# Patient Record
Sex: FEMALE | Race: WHITE | Employment: STUDENT | URBAN - METROPOLITAN AREA
[De-identification: names, ages, dates, MRNs, and addresses within clinical notes are randomized per-mention and may not be internally consistent; named-entity substitution may affect disease eponyms.]

---
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
## 2023-01-08 ENCOUNTER — APPOINTMENT (OUTPATIENT)
Dept: GENERAL RADIOLOGY | Age: 23
End: 2023-01-08
Attending: NURSE PRACTITIONER
Payer: COMMERCIAL

## 2023-01-08 ENCOUNTER — HOSPITAL ENCOUNTER (OUTPATIENT)
Age: 23
Discharge: HOME | End: 2023-01-08
Payer: COMMERCIAL

## 2023-01-08 VITALS
SYSTOLIC BLOOD PRESSURE: 126 MMHG | DIASTOLIC BLOOD PRESSURE: 75 MMHG | TEMPERATURE: 98 F | HEART RATE: 85 BPM | OXYGEN SATURATION: 97 % | RESPIRATION RATE: 16 BRPM

## 2023-01-08 DIAGNOSIS — S69.92XA INJURY OF LEFT WRIST, INITIAL ENCOUNTER: ICD-10-CM

## 2023-01-08 DIAGNOSIS — S52.522A CLOSED TORUS FRACTURE OF DISTAL END OF LEFT RADIUS, INITIAL ENCOUNTER: Primary | ICD-10-CM

## 2023-01-08 PROCEDURE — 73110 X-RAY EXAM OF WRIST: CPT | Performed by: NURSE PRACTITIONER

## 2023-01-08 PROCEDURE — 29125 APPL SHORT ARM SPLINT STATIC: CPT | Performed by: NURSE PRACTITIONER

## 2023-01-08 PROCEDURE — 73090 X-RAY EXAM OF FOREARM: CPT | Performed by: NURSE PRACTITIONER

## 2023-01-08 PROCEDURE — 99213 OFFICE O/P EST LOW 20 MIN: CPT | Performed by: NURSE PRACTITIONER

## 2023-01-08 NOTE — DISCHARGE INSTRUCTIONS
Keep splint on and dry at all times. No use of extremity or no weight-bearing until cleared by specialist.   Elevate when seated, and apply ice every 2-3 hours for 20 minutes. Take recommended pain medications as instructed. Over-the-counter ibuprofen or Tylenol  Follow-up with orthopedic.    Return immediately for loss of motor or sensory function to the affected extremity, severe pain, or any new condition

## 2023-01-08 NOTE — ED INITIAL ASSESSMENT (HPI)
1/7 0600 Pt fell backwards onto her left wrist and heard a pop.   Pt c/o lateral and medial left wrist pain, +full ROM but painful, slight bruising noted    Denies: numbness/tingling at this time

## 2024-09-12 ENCOUNTER — TELEMEDICINE (OUTPATIENT)
Dept: FAMILY MEDICINE CLINIC | Facility: CLINIC | Age: 24
End: 2024-09-12
Payer: COMMERCIAL

## 2024-09-12 DIAGNOSIS — J06.9 URI, ACUTE: Primary | ICD-10-CM

## 2024-09-12 PROCEDURE — 99213 OFFICE O/P EST LOW 20 MIN: CPT | Performed by: FAMILY MEDICINE

## 2024-09-12 NOTE — PROGRESS NOTES
Virtual/Telephone Check-In    Lucretia Drew verbally consents to a Virtual/Telephone Check-In service on 09/12/24. Patient understands and accepts financial responsibility for any deductible, co-insurance and/or co-pays associated with this service. This visit is conducted using Telemedicine with live audio.     I returned Lucretia Drew call by secure telephone chat, verified date of birth, and discussed their current concerns:     Patient states that she did do an at home COVID test and found a faint second line.  She states that she has had some congestion cough sinus pressure.  States that the symptoms started on Sunday.  She has not had a fever denies any shortness of breath.    Review of Systems:   Ten point review of systems has been performed and is otherwise negative and/or non-contributory, except as described above.    Current Outpatient Medications   Medication Sig Dispense Refill    SERTRALINE HCL OR Take by mouth.      UNKNOWN TO PATIENT - BIRTH CONTROL       AMNESTEEM 40 MG Oral Cap TK 1 C PO QD (Patient not taking: Reported on 1/8/2023)      CLARAVIS 30 MG Oral Cap  (Patient not taking: Reported on 1/8/2023)      cetirizine 10 MG Oral Tab Take 10 mg by mouth daily.      Dexmethylphenidate HCl ER 10 MG Oral Capsule SR 24 Hr Take 10 mg by mouth every morning. (Patient not taking: Reported on 1/8/2023) 30 capsule 0     Patient Active Problem List   Diagnosis    Left foot pain    Metatarsal fracture fx care through 12/24/14    Closed nondisplaced fracture of sternal end of right clavicle    Closed nondisplaced fracture of sternal end of right clavicle with routine healing, subsequent encounter       Physical Exam:  General: well-appearing individual in no apparent distress    HEENT: Head appears NCAT  Extra-ocular movements grossly intact  Eyes appear healthy, non-injected, no exudate or discharge  Nares no discharge  Oropharynx appears normal  Mouth has moist mucus membranes    Neck: Neck  appears supple  no hesitation with movement of the neck  no cervical lymphadenopathy    Cardiovascular: hands and fingers are pink and well-perfused    Lungs: comfortable respirations  Positive cough  no tachypnea  no retractions appreciated  no wheezing appreciated      Diagnosis:  1. URI, acute  I did discuss with the patient that I do not feel that this is COVID.  Although I did ask her to continue to do supportive treatment.  She has had symptoms since Sunday.  She denies any shortness of breath.  She was asked to use Flonase twice a day Benadryl as well as ibuprofen for any aches and pains.  She was asked to follow-up with her primary care physician if she has any continued symptoms.    Follow up: None 14  Duration of service, in minutes: 13  Patient also advised to follow CDC guidelines for self isolation/social distancing and symptomatic treatment as outlined on CDC Patient Guidelines.  Derrick Abreu MD

## 2024-11-06 ENCOUNTER — OFFICE VISIT (OUTPATIENT)
Dept: FAMILY MEDICINE CLINIC | Facility: CLINIC | Age: 24
End: 2024-11-06
Payer: COMMERCIAL

## 2024-11-06 VITALS
WEIGHT: 198 LBS | DIASTOLIC BLOOD PRESSURE: 60 MMHG | RESPIRATION RATE: 16 BRPM | TEMPERATURE: 97 F | SYSTOLIC BLOOD PRESSURE: 122 MMHG | BODY MASS INDEX: 29.33 KG/M2 | HEART RATE: 84 BPM | HEIGHT: 69.09 IN

## 2024-11-06 DIAGNOSIS — E01.0 THYROMEGALY: ICD-10-CM

## 2024-11-06 DIAGNOSIS — Z00.00 WELLNESS EXAMINATION: Primary | ICD-10-CM

## 2024-11-06 DIAGNOSIS — R19.7 INTERMITTENT DIARRHEA: ICD-10-CM

## 2024-11-06 DIAGNOSIS — Z23 NEED FOR VACCINATION: ICD-10-CM

## 2024-11-06 PROCEDURE — 3078F DIAST BP <80 MM HG: CPT | Performed by: STUDENT IN AN ORGANIZED HEALTH CARE EDUCATION/TRAINING PROGRAM

## 2024-11-06 PROCEDURE — 99395 PREV VISIT EST AGE 18-39: CPT | Performed by: STUDENT IN AN ORGANIZED HEALTH CARE EDUCATION/TRAINING PROGRAM

## 2024-11-06 PROCEDURE — 90471 IMMUNIZATION ADMIN: CPT | Performed by: STUDENT IN AN ORGANIZED HEALTH CARE EDUCATION/TRAINING PROGRAM

## 2024-11-06 PROCEDURE — 90715 TDAP VACCINE 7 YRS/> IM: CPT | Performed by: STUDENT IN AN ORGANIZED HEALTH CARE EDUCATION/TRAINING PROGRAM

## 2024-11-06 PROCEDURE — 3074F SYST BP LT 130 MM HG: CPT | Performed by: STUDENT IN AN ORGANIZED HEALTH CARE EDUCATION/TRAINING PROGRAM

## 2024-11-06 PROCEDURE — 3008F BODY MASS INDEX DOCD: CPT | Performed by: STUDENT IN AN ORGANIZED HEALTH CARE EDUCATION/TRAINING PROGRAM

## 2024-11-06 RX ORDER — FEXOFENADINE HCL 180 MG/1
180 TABLET ORAL DAILY
COMMUNITY

## 2024-11-06 RX ORDER — METHYLPHENIDATE HYDROCHLORIDE 10 MG/1
10 TABLET ORAL 2 TIMES DAILY
COMMUNITY
Start: 2024-07-25

## 2024-11-06 RX ORDER — TRETINOIN 0.25 MG/G
CREAM TOPICAL
COMMUNITY
Start: 2024-06-18

## 2024-11-06 NOTE — PROGRESS NOTES
[facility]  11/06/24    Chief Complaint   Patient presents with    Physical    Establish Care       HPI:   Lucretia Drew is a 24 year old female who presents for a complete physical exam.     PMH: denied  PSH: ACL/meniscus/LDL redone 3 years ago; 7 years ago April she was a freshman in high school this had sternal fracture after horse flipped - had PTSD afterwards  Meds: reviewed - follows with EWA Robles through advanced behavioral health [facility]  Allergies: seasonal, azithromycin - rash  Home: lives with mother  Work: graduated semester early 12/2023 from [place]; now  through [employer]  Hobbies: rides horses competitively  Habits: Denied alcohol, tobacco, or drug use.  Fhx: father had palpitations and is on blood thinners; mother had thyroid cancer, maternal's side has had thyroid issues and has had thyroidectomy   Diet: healthy diet  Exercise: 3-4 times a week  Periods: Patient's last menstrual period was 10/15/2024 (exact date). Monthly. Was previously on birth control for acutane and stayed on it. Has been off so far and has had two periods so far, starting to get heavier but manageable. Never had dysmenorrhea. Occasional cramp.  Sexual activity: never  Immunizations: discussed  Screenings: never had pap    Possible IBS. Unsure of allergies. Aware of triggers for IBS. Will have flares of diarrhea.     Has US thyroid in the past for mild thyromegaly.     Wt Readings from Last 6 Encounters:   11/06/24 198 lb (89.8 kg)   07/17/21 190 lb 11.2 oz (86.5 kg) (96%, Z= 1.81)*   03/01/20 190 lb 9.6 oz (86.5 kg) (97%, Z= 1.85)*   05/07/18 169 lb 12.1 oz (77 kg) (94%, Z= 1.60)*   09/26/16 170 lb 13.7 oz (77.5 kg) (96%, Z= 1.79)*   10/20/14 138 lb (62.6 kg) (94%, Z= 1.52)*     * Growth percentiles are based on CDC (Girls, 2-20 Years) data.     Body mass index is 29.16 kg/m².     Results for orders placed or performed in visit on 05/20/20   HCG, Beta Subunit,  Qual    Collection Time: 05/20/20 11:36 AM   Result Value Ref Range    HCG, Serum Qualitative (S) Negative Negative   Lipid Panel    Collection Time: 05/20/20 11:36 AM   Result Value Ref Range    Cholesterol, Total 137 <200 mg/dL    HDL Cholesterol 41 40 - 59 mg/dL    Triglycerides 115 30 - 149 mg/dL    LDL Cholesterol 73 <100 mg/dL    VLDL 23 0 - 30 mg/dL    Non HDL Chol 96 <130 mg/dL    FASTING Yes    ALT(SGPT)    Collection Time: 05/20/20 11:36 AM   Result Value Ref Range    ALT 17 13 - 56 U/L   AST (SGOT)    Collection Time: 05/20/20 11:36 AM   Result Value Ref Range    AST 26 15 - 37 U/L       Current Outpatient Medications   Medication Sig Dispense Refill    fexofenadine 180 MG Oral Tab Take 1 tablet (180 mg total) by mouth daily.      methylphenidate 10 MG Oral Tab Take 1 tablet (10 mg total) by mouth 2 (two) times daily.      tretinoin 0.025 % External Cream APPLY TO TO AFFECTED AREA AT BED TIME AS TOLERATES      SERTRALINE HCL OR Take by mouth.        Allergies[1]   Past Medical History:    ADHD (attention deficit hyperactivity disorder)    Allergic rhinitis    Anxiety    Depression    PTSD (post-traumatic stress disorder)      Past Surgical History:   Procedure Laterality Date    Anterior cruciate ligament repair      B cell lymphoma, fish, tissue      Other surgical history  2017 and 2022    ACL surgery and shoulder surgery    Tonsillectomy      adnoidectomy      Family History   Problem Relation Age of Onset    Cancer Mother         melanoma    Other (Other[other]) Mother         thyroid nodules    Thyroid Cancer Mother     Anxiety Mother     Hypertension Father     Alcohol abuse Father     Arrhythmia Father         on blood thinner    Brain Cancer Paternal Uncle       Social History:   Social History     Socioeconomic History    Marital status: Single   Tobacco Use    Smoking status: Never     Passive exposure: Never    Smokeless tobacco: Never   Vaping Use    Vaping status: Never Used   Substance and  Sexual Activity    Alcohol use: Yes     Alcohol/week: 2.0 standard drinks of alcohol     Types: 2 Standard drinks or equivalent per week     Comment: socially    Drug use: No   Other Topics Concern    Caffeine Concern No    Exercise No    Seat Belt No        REVIEW OF SYSTEMS:   GENERAL: feels well otherwise  SKIN: denies any unusual skin lesions  EYES:denies blurred vision or double vision  HEENT: denies nasal congestion, sinus pain or ST  LUNGS: denies shortness of breath with exertion, denies cough  CARDIOVASCULAR: denies chest pain on exertion or at rest, denies palpitations  GI: denies abdominal pain,denies heartburn, denies n/v/d/c/blood in stool  : denies dysuria, vaginal discharge or itching, periods regular  MUSCULOSKELETAL: denies back pain  NEURO: denies headaches, denies LH/dizziness/syncope  PSYCHE: see HPI  HEMATOLOGIC: denies hx of anemia  ENDOCRINE: denies thyroid history  ALL/ASTHMA: denies hx of allergy or asthma    EXAM:   /60   Pulse 84   Temp 97 °F (36.1 °C) (Temporal)   Resp 16   Ht 5' 9.09\" (1.755 m)   Wt 198 lb (89.8 kg)   LMP 10/15/2024 (Exact Date)   BMI 29.16 kg/m²   Body mass index is 29.16 kg/m².   GENERAL: well developed, well nourished,in no apparent distress  SKIN: no rashes,no suspicious lesions  HEENT: atraumatic, normocephalic,ears and throat are clear  EYES:PERRLA, EOMI, conjunctiva are clear  NECK: supple,no adenopathy,no thyromegaly  BREAST: deferred per patient preference  LUNGS: clear to auscultation, no r/r/w  CARDIO: RRR without murmur  GI: good BS's,no masses, HSM or tenderness  : deferred per patient preference  MUSCULOSKELETAL: back is not tender,FROM of the back  EXTREMITIES: no cyanosis, clubbing or edema  NEURO: Oriented times three,cranial nerves are intact,motor and sensory are grossly intact; 2 + knee reflexes bilaterally  VASCULAR: 2+ posterior tibialis pulses bilaterally    ASSESSMENT AND PLAN:   Lucretia Drew is a 24 year old female who  presents for a complete physical exam.    1. Wellness examination  - Pap deferred per patient preference.  - BP: at goal  - BMI: Body mass index is 29.16 kg/m²., c/w overweight BMI, recommended low fat diet and aerobic exercise 30 minutes three times weekly.   - CBC With Differential With Platelet; Future  - Comp Metabolic Panel (14); Future  - TSH W Reflex To Free T4; Future  - Lipid Panel; Future  - Urinalysis with Culture Reflex; Future    2. Need for vaccination  Tdap today  - TdaP (Boostrix) Vaccine (> 7 Y)    3. Intermittent diarrhea  Will check celiac  - CELIAC DISEASE SCREEN Reflex [E]; Future    4. Thyromegaly  Will check US thyroid  - US THYROID (CPT=76536); Future      The patient indicates understanding of these issues and agrees to the plan.    Health maintenance, will check :   Orders Placed This Encounter   Procedures    CBC With Differential With Platelet    Comp Metabolic Panel (14)    TSH W Reflex To Free T4    Lipid Panel    Urinalysis with Culture Reflex    CELIAC DISEASE SCREEN Reflex [E]    TdaP (Boostrix) Vaccine (> 7 Y)           Encounter Diagnoses   Name Primary?    Wellness examination Yes    Need for vaccination     Intermittent diarrhea        Meds & Refills for this Visit:  Requested Prescriptions      No prescriptions requested or ordered in this encounter       Imaging & Consults:  TETANUS, DIPHTHERIA TOXOIDS AND ACELLULAR PERTUSIS VACCINE (TDAP), >7 YEARS, IM USE      Return in about 1 year (around 11/6/2025) for annual physical, or sooner if needed.        Amber Rendon MD  [facility]  11/06/24      Please note that portions of this note may have been completed with a voice recognition program. Efforts were made to edit the dictations but occasionally words are mis-transcribed. Thank you for your understanding.    The 21st Century Cures Act makes medical notes like these available to patients in the interest of transparency. Please be advised  this is a medical document. Medical documents are intended to carry relevant information, facts as evident, and the clinical opinion of the practitioner. The medical note is intended as peer to peer communication and may appear blunt or direct. It is written in medical language and may contain abbreviations or verbiage that are unfamiliar. If there are any questions or concerns please contact the provider for clarification.            [1]   Allergies  Allergen Reactions    Zithromax [Azithromycin] RASH

## 2024-11-06 NOTE — PATIENT INSTRUCTIONS
Refill policies:      Allow 3 business days for refills; controlled substances may take longer.  Contact your pharmacy at least 5-7 business days prior to running out of medication and have them send an electronic request or submit through the \"request refill\" option thru your [employer] account. No need to do both, as multiple requests will create an automated [employer] message to notify of a denial for one of the duplicated requests, causing you undue confusion.   Refills are NOT addressed on weekends; covering physicians do not authorize routine medications on weekends.  No narcotics or controlled substances are refilled after noon on Fridays or by on call physicians.  By law, narcotics cannot be faxed or phoned into your pharmacy.  If your prescription is due for a refill, you may be due for a follow up appointment. Please call our office at [phone] to make an appointment or schedule an appointment via [employer].  To best provide you care, patients receiving routine medications need to be seen at least twice a year. Patients receiving narcotic/controlled substance medications need to be seen at least once every 3 months.  In the event that your preferred pharmacy does not have the requested medication in stock (ie Backordered), it is your responsibility to find another pharmacy that has the requested medication available. We will gladly send a new prescription to that pharmacy at your request.  controlled substances may not be able to be filled out of state due to license restrictions.  If you have a planned trip, it's best to call your pharmacy at least 5-7 business days to prevent any delays in your medication refill.    Scheduling Tests:    If your physician has ordered radiology tests such as MRI or CT scans, please contact Central Scheduling at [phone] right away to schedule the test.  Once scheduled, the [facility] Centralized Referral Team will work with your insurance carrier to obtain pre-certification or  prior authorization.  Depending on your insurance carrier, approval may take 3-10 days.  It is highly recommended patients assure they have received an authorization before having a test performed.  If test is done without insurance authorization, patient may be responsible for the entire amount billed.      Precertification and Prior Authorizations:  If your physician has recommended that you have a procedure or additional testing performed the [facility] Centralized Referral Team will contact your insurance carrier to obtain pre-certification or prior authorization.    You are strongly encouraged to contact your insurance carrier to verify that your procedure/test has been approved and is a COVERED benefit.  Although the [facility] Centralized Referral Team does its due diligence, the insurance carrier gives the disclaimer that \"Although the procedure is authorized, this does not guarantee payment.\"    Ultimately the patient is responsible for payment.   Thank you for your understanding in this matter.  Paperwork Completion:  If you require FMLA or disability paperwork for your recovery, please make sure to either drop it off or have it faxed to our office at [phone]. Be sure the form has your name and date of birth on it.  The form will be faxed to our Forms Department and they will complete it for you.  There is a 25$ fee for all forms that need to be filled out.  Please be aware there is a 10-14 day turnaround time.  You will need to sign a release of information (DESHAWN) form if your paperwork does not come with one.  You may call the Forms Department with any questions at [phone].  Their fax number is [phone].

## 2024-12-24 ENCOUNTER — LAB ENCOUNTER (OUTPATIENT)
Dept: LAB | Age: 24
End: 2024-12-24
Attending: STUDENT IN AN ORGANIZED HEALTH CARE EDUCATION/TRAINING PROGRAM
Payer: COMMERCIAL

## 2024-12-24 DIAGNOSIS — Z00.00 WELLNESS EXAMINATION: ICD-10-CM

## 2024-12-24 DIAGNOSIS — R19.7 INTERMITTENT DIARRHEA: ICD-10-CM

## 2024-12-24 LAB
ALBUMIN SERPL-MCNC: 4.7 G/DL (ref 3.2–4.8)
ALBUMIN/GLOB SERPL: 1.5 {RATIO} (ref 1–2)
ALP LIVER SERPL-CCNC: 80 U/L
ALT SERPL-CCNC: 13 U/L
ANION GAP SERPL CALC-SCNC: 8 MMOL/L (ref 0–18)
AST SERPL-CCNC: 19 U/L (ref ?–34)
BASOPHILS # BLD AUTO: 0.08 X10(3) UL (ref 0–0.2)
BASOPHILS NFR BLD AUTO: 1.5 %
BILIRUB SERPL-MCNC: 0.5 MG/DL (ref 0.3–1.2)
BILIRUB UR QL STRIP.AUTO: NEGATIVE
BUN BLD-MCNC: 10 MG/DL (ref 9–23)
CALCIUM BLD-MCNC: 10.2 MG/DL (ref 8.7–10.4)
CHLORIDE SERPL-SCNC: 104 MMOL/L (ref 98–112)
CHOLEST SERPL-MCNC: 147 MG/DL (ref ?–200)
CLARITY UR REFRACT.AUTO: CLEAR
CO2 SERPL-SCNC: 29 MMOL/L (ref 21–32)
CREAT BLD-MCNC: 0.88 MG/DL
EGFRCR SERPLBLD CKD-EPI 2021: 95 ML/MIN/1.73M2 (ref 60–?)
EOSINOPHIL # BLD AUTO: 0.2 X10(3) UL (ref 0–0.7)
EOSINOPHIL NFR BLD AUTO: 3.8 %
ERYTHROCYTE [DISTWIDTH] IN BLOOD BY AUTOMATED COUNT: 11.9 %
FASTING PATIENT LIPID ANSWER: YES
FASTING STATUS PATIENT QL REPORTED: YES
GLOBULIN PLAS-MCNC: 3.2 G/DL (ref 2–3.5)
GLUCOSE BLD-MCNC: 85 MG/DL (ref 70–99)
GLUCOSE UR STRIP.AUTO-MCNC: NORMAL MG/DL
HCT VFR BLD AUTO: 42.1 %
HDLC SERPL-MCNC: 60 MG/DL (ref 40–59)
HGB BLD-MCNC: 14 G/DL
IGA SERPL-MCNC: 224.6 MG/DL (ref 70–312)
IMM GRANULOCYTES # BLD AUTO: 0.02 X10(3) UL (ref 0–1)
IMM GRANULOCYTES NFR BLD: 0.4 %
KETONES UR STRIP.AUTO-MCNC: NEGATIVE MG/DL
LDLC SERPL CALC-MCNC: 72 MG/DL (ref ?–100)
LEUKOCYTE ESTERASE UR QL STRIP.AUTO: 75
LYMPHOCYTES # BLD AUTO: 1.85 X10(3) UL (ref 1–4)
LYMPHOCYTES NFR BLD AUTO: 34.9 %
MCH RBC QN AUTO: 30 PG (ref 26–34)
MCHC RBC AUTO-ENTMCNC: 33.3 G/DL (ref 31–37)
MCV RBC AUTO: 90.3 FL
MONOCYTES # BLD AUTO: 0.41 X10(3) UL (ref 0.1–1)
MONOCYTES NFR BLD AUTO: 7.7 %
NEUTROPHILS # BLD AUTO: 2.74 X10 (3) UL (ref 1.5–7.7)
NEUTROPHILS # BLD AUTO: 2.74 X10(3) UL (ref 1.5–7.7)
NEUTROPHILS NFR BLD AUTO: 51.7 %
NITRITE UR QL STRIP.AUTO: NEGATIVE
NONHDLC SERPL-MCNC: 87 MG/DL (ref ?–130)
OSMOLALITY SERPL CALC.SUM OF ELEC: 290 MOSM/KG (ref 275–295)
PH UR STRIP.AUTO: 6.5 [PH] (ref 5–8)
PLATELET # BLD AUTO: 314 10(3)UL (ref 150–450)
POTASSIUM SERPL-SCNC: 4.2 MMOL/L (ref 3.5–5.1)
PROT SERPL-MCNC: 7.9 G/DL (ref 5.7–8.2)
PROT UR STRIP.AUTO-MCNC: NEGATIVE MG/DL
RBC # BLD AUTO: 4.66 X10(6)UL
SODIUM SERPL-SCNC: 141 MMOL/L (ref 136–145)
SP GR UR STRIP.AUTO: 1.01 (ref 1–1.03)
TRIGL SERPL-MCNC: 77 MG/DL (ref 30–149)
TSI SER-ACNC: 2.2 UIU/ML (ref 0.55–4.78)
UROBILINOGEN UR STRIP.AUTO-MCNC: NORMAL MG/DL
VLDLC SERPL CALC-MCNC: 12 MG/DL (ref 0–30)
WBC # BLD AUTO: 5.3 X10(3) UL (ref 4–11)

## 2024-12-24 PROCEDURE — 80050 GENERAL HEALTH PANEL: CPT | Performed by: STUDENT IN AN ORGANIZED HEALTH CARE EDUCATION/TRAINING PROGRAM

## 2024-12-24 PROCEDURE — 81001 URINALYSIS AUTO W/SCOPE: CPT | Performed by: STUDENT IN AN ORGANIZED HEALTH CARE EDUCATION/TRAINING PROGRAM

## 2024-12-24 PROCEDURE — 87086 URINE CULTURE/COLONY COUNT: CPT | Performed by: STUDENT IN AN ORGANIZED HEALTH CARE EDUCATION/TRAINING PROGRAM

## 2024-12-24 PROCEDURE — 86364 TISS TRNSGLTMNASE EA IG CLAS: CPT | Performed by: STUDENT IN AN ORGANIZED HEALTH CARE EDUCATION/TRAINING PROGRAM

## 2024-12-24 PROCEDURE — 80061 LIPID PANEL: CPT | Performed by: STUDENT IN AN ORGANIZED HEALTH CARE EDUCATION/TRAINING PROGRAM

## 2024-12-24 PROCEDURE — 82784 ASSAY IGA/IGD/IGG/IGM EACH: CPT | Performed by: STUDENT IN AN ORGANIZED HEALTH CARE EDUCATION/TRAINING PROGRAM

## 2024-12-26 LAB — TTG IGA SER-ACNC: 1 U/ML (ref ?–7)

## 2025-01-16 ENCOUNTER — PATIENT MESSAGE (OUTPATIENT)
Dept: FAMILY MEDICINE CLINIC | Facility: CLINIC | Age: 25
End: 2025-01-16

## 2025-01-16 DIAGNOSIS — R19.7 INTERMITTENT DIARRHEA: Primary | ICD-10-CM

## 2025-01-17 NOTE — TELEPHONE ENCOUNTER
Yes will put in referral for [facility] gastro[facility]ology. Thank you.

## 2025-02-11 ENCOUNTER — HOSPITAL ENCOUNTER (OUTPATIENT)
Dept: ULTRASOUND IMAGING | Age: 25
Discharge: HOME | End: 2025-02-11
Attending: STUDENT IN AN ORGANIZED HEALTH CARE EDUCATION/TRAINING PROGRAM
Payer: COMMERCIAL

## 2025-02-11 DIAGNOSIS — E01.0 THYROMEGALY: ICD-10-CM

## 2025-02-11 PROCEDURE — 76536 US EXAM OF HEAD AND NECK: CPT | Performed by: STUDENT IN AN ORGANIZED HEALTH CARE EDUCATION/TRAINING PROGRAM

## 2025-02-12 DIAGNOSIS — E04.2 MULTIPLE THYROID NODULES: Primary | ICD-10-CM

## 2025-04-14 PROBLEM — R19.7 DIARRHEA: Status: ACTIVE | Noted: 2025-04-14

## 2025-04-16 RX ORDER — TRETINOIN 0.25 MG/G
CREAM TOPICAL
Refills: 0 | OUTPATIENT
Start: 2025-04-16

## 2025-04-16 NOTE — TELEPHONE ENCOUNTER
[facility] sent informing patient that this medication was prescribed by provider outside of the clinic.

## (undated) NOTE — LETTER
Date & Time: 5/7/2018, 7:11 PM  Patient: Chelo Rios  Encounter Provider(s):    Severo Locks, MD       To Whom It May Concern:    Rose Rivera was seen and treated in our department on 5/7/2018.  She is not to return to sports and must u

## (undated) NOTE — LETTER
Date & Time: 5/7/2018, 8:11 PM  Patient: Yen Garcia  Encounter Provider(s):    Jennifer Evans MD       To Whom It May Concern:    Delma Gomez was seen and treated in our department on 5/7/2018.  She may not return to sports until cleare

## (undated) NOTE — LETTER
Date & Time: 7/18/2021, 12:59 PM  Patient: Erica Weeks  Encounter Provider(s):    EWA Prasad       To Whom It May Concern:    Kady Del Toro was seen and treated in our department on 7/17/2021.  She may return to work as long as she varghese